# Patient Record
Sex: MALE | Race: WHITE | NOT HISPANIC OR LATINO
[De-identification: names, ages, dates, MRNs, and addresses within clinical notes are randomized per-mention and may not be internally consistent; named-entity substitution may affect disease eponyms.]

---

## 2018-04-06 ENCOUNTER — RECORD ABSTRACTING (OUTPATIENT)
Age: 33
End: 2018-04-06

## 2018-04-06 DIAGNOSIS — R10.9 UNSPECIFIED ABDOMINAL PAIN: ICD-10-CM

## 2018-04-06 DIAGNOSIS — Z91.89 OTHER SPECIFIED PERSONAL RISK FACTORS, NOT ELSEWHERE CLASSIFIED: ICD-10-CM

## 2018-04-06 DIAGNOSIS — Z86.59 PERSONAL HISTORY OF OTHER MENTAL AND BEHAVIORAL DISORDERS: ICD-10-CM

## 2018-04-06 DIAGNOSIS — S39.012A STRAIN OF MUSCLE, FASCIA AND TENDON OF LOWER BACK, INITIAL ENCOUNTER: ICD-10-CM

## 2018-04-06 DIAGNOSIS — J06.9 ACUTE UPPER RESPIRATORY INFECTION, UNSPECIFIED: ICD-10-CM

## 2018-04-06 DIAGNOSIS — Z78.9 OTHER SPECIFIED HEALTH STATUS: ICD-10-CM

## 2018-04-06 DIAGNOSIS — Z87.898 PERSONAL HISTORY OF OTHER SPECIFIED CONDITIONS: ICD-10-CM

## 2018-04-11 ENCOUNTER — APPOINTMENT (OUTPATIENT)
Dept: FAMILY MEDICINE | Facility: CLINIC | Age: 33
End: 2018-04-11
Payer: SELF-PAY

## 2018-04-11 VITALS
WEIGHT: 194 LBS | BODY MASS INDEX: 30.45 KG/M2 | DIASTOLIC BLOOD PRESSURE: 70 MMHG | HEART RATE: 58 BPM | SYSTOLIC BLOOD PRESSURE: 118 MMHG | TEMPERATURE: 97.7 F | RESPIRATION RATE: 15 BRPM | OXYGEN SATURATION: 98 % | HEIGHT: 67 IN

## 2018-04-11 PROCEDURE — 81002 URINALYSIS NONAUTO W/O SCOPE: CPT

## 2018-04-11 PROCEDURE — 99395 PREV VISIT EST AGE 18-39: CPT

## 2018-04-12 LAB
BILIRUB UR QL STRIP: NORMAL
GLUCOSE UR-MCNC: NORMAL
HCG UR QL: 0.2 EU/DL
HGB UR QL STRIP.AUTO: NORMAL
KETONES UR-MCNC: NORMAL
LEUKOCYTE ESTERASE UR QL STRIP: NORMAL
NITRITE UR QL STRIP: NORMAL
PH UR STRIP: 7
PROT UR STRIP-MCNC: NORMAL
SP GR UR STRIP: 1.01

## 2018-05-11 ENCOUNTER — APPOINTMENT (OUTPATIENT)
Dept: FAMILY MEDICINE | Facility: CLINIC | Age: 33
End: 2018-05-11

## 2018-05-12 ENCOUNTER — APPOINTMENT (OUTPATIENT)
Dept: FAMILY MEDICINE | Facility: CLINIC | Age: 33
End: 2018-05-12
Payer: COMMERCIAL

## 2018-05-12 VITALS
BODY MASS INDEX: 27.3 KG/M2 | DIASTOLIC BLOOD PRESSURE: 70 MMHG | TEMPERATURE: 97.7 F | HEIGHT: 71 IN | RESPIRATION RATE: 16 BRPM | SYSTOLIC BLOOD PRESSURE: 100 MMHG | WEIGHT: 195 LBS | OXYGEN SATURATION: 98 % | HEART RATE: 61 BPM

## 2018-05-12 DIAGNOSIS — Z91.09 OTHER ALLERGY STATUS, OTHER THAN TO DRUGS AND BIOLOGICAL SUBSTANCES: ICD-10-CM

## 2018-05-12 DIAGNOSIS — J32.9 CHRONIC SINUSITIS, UNSPECIFIED: ICD-10-CM

## 2018-05-12 DIAGNOSIS — R69 ILLNESS, UNSPECIFIED: ICD-10-CM

## 2018-05-12 PROCEDURE — 71046 X-RAY EXAM CHEST 2 VIEWS: CPT | Mod: 59

## 2018-05-12 PROCEDURE — 99214 OFFICE O/P EST MOD 30 MIN: CPT | Mod: 25

## 2018-05-12 PROCEDURE — 36415 COLL VENOUS BLD VENIPUNCTURE: CPT

## 2018-05-14 LAB
ALBUMIN SERPL ELPH-MCNC: 4.5 G/DL
ALP BLD-CCNC: 58 U/L
ALT SERPL-CCNC: 17 U/L
ANION GAP SERPL CALC-SCNC: 12 MMOL/L
AST SERPL-CCNC: 20 U/L
BASOPHILS # BLD AUTO: 0.01 K/UL
BASOPHILS NFR BLD AUTO: 0.3 %
BILIRUB SERPL-MCNC: 0.6 MG/DL
BUN SERPL-MCNC: 13 MG/DL
CALCIUM SERPL-MCNC: 9.8 MG/DL
CHLORIDE SERPL-SCNC: 102 MMOL/L
CHOLEST SERPL-MCNC: 153 MG/DL
CHOLEST/HDLC SERPL: 2.6 RATIO
CO2 SERPL-SCNC: 25 MMOL/L
CREAT SERPL-MCNC: 1.18 MG/DL
EOSINOPHIL # BLD AUTO: 0.02 K/UL
EOSINOPHIL NFR BLD AUTO: 0.6 %
ERYTHROCYTE [SEDIMENTATION RATE] IN BLOOD BY WESTERGREN METHOD: 9 MM/HR
GLUCOSE SERPL-MCNC: 93 MG/DL
HBA1C MFR BLD HPLC: 5.2 %
HCT VFR BLD CALC: 41.4 %
HDLC SERPL-MCNC: 60 MG/DL
HGB BLD-MCNC: 14.5 G/DL
IMM GRANULOCYTES NFR BLD AUTO: 0 %
LDLC SERPL CALC-MCNC: 83 MG/DL
LYMPHOCYTES # BLD AUTO: 0.88 K/UL
LYMPHOCYTES NFR BLD AUTO: 25.1 %
MAN DIFF?: NORMAL
MCHC RBC-ENTMCNC: 32.5 PG
MCHC RBC-ENTMCNC: 35 GM/DL
MCV RBC AUTO: 92.8 FL
MONOCYTES # BLD AUTO: 0.48 K/UL
MONOCYTES NFR BLD AUTO: 13.7 %
NEUTROPHILS # BLD AUTO: 2.11 K/UL
NEUTROPHILS NFR BLD AUTO: 60.3 %
PLATELET # BLD AUTO: 233 K/UL
POTASSIUM SERPL-SCNC: 4.2 MMOL/L
PROT SERPL-MCNC: 7.6 G/DL
RBC # BLD: 4.46 M/UL
RBC # FLD: 12.8 %
SODIUM SERPL-SCNC: 139 MMOL/L
TRIGL SERPL-MCNC: 49 MG/DL
TSH SERPL-ACNC: 0.35 UIU/ML
WBC # FLD AUTO: 3.5 K/UL

## 2018-05-15 LAB
M PNEUMO IGG SER IA-ACNC: POSITIVE
M PNEUMO IGG SER QL IA: 1.29 INDEX
M PNEUMO IGM SER QL IA: 80 UNITS/ML
MYCOPLASMA AG SPEC QL: NEGATIVE

## 2018-05-18 LAB
CHLAMYDIA PNEUMONIAE AB IGA: NORMAL TITER
CHLAMYDIA PNEUMONIAE AB IGG: NORMAL TITER
CHLAMYDIA PNEUMONIAE AB IGM: NORMAL TITER
TESTOST BND SERPL-MCNC: 8 PG/ML
TESTOST SERPL-MCNC: 225.8 NG/DL

## 2018-06-05 ENCOUNTER — NON-APPOINTMENT (OUTPATIENT)
Age: 33
End: 2018-06-05

## 2018-06-05 ENCOUNTER — APPOINTMENT (OUTPATIENT)
Dept: FAMILY MEDICINE | Facility: CLINIC | Age: 33
End: 2018-06-05
Payer: COMMERCIAL

## 2018-06-05 VITALS
HEIGHT: 71 IN | HEART RATE: 71 BPM | RESPIRATION RATE: 16 BRPM | OXYGEN SATURATION: 98 % | SYSTOLIC BLOOD PRESSURE: 130 MMHG | WEIGHT: 193 LBS | DIASTOLIC BLOOD PRESSURE: 80 MMHG | BODY MASS INDEX: 27.02 KG/M2 | TEMPERATURE: 98.1 F

## 2018-06-05 PROCEDURE — 93000 ELECTROCARDIOGRAM COMPLETE: CPT

## 2018-06-05 PROCEDURE — 99214 OFFICE O/P EST MOD 30 MIN: CPT | Mod: 25

## 2018-06-05 RX ORDER — METHYLPREDNISOLONE 4 MG/1
4 TABLET ORAL
Qty: 1 | Refills: 0 | Status: COMPLETED | COMMUNITY
Start: 2018-05-12 | End: 2018-05-19

## 2018-06-05 RX ORDER — AZITHROMYCIN 250 MG/1
250 TABLET, FILM COATED ORAL
Qty: 6 | Refills: 0 | Status: COMPLETED | COMMUNITY
Start: 2018-05-12 | End: 2018-05-19

## 2018-06-05 RX ORDER — LEVOCETIRIZINE DIHYDROCHLORIDE 5 MG/1
5 TABLET ORAL DAILY
Qty: 90 | Refills: 0 | Status: COMPLETED | COMMUNITY
Start: 2018-05-12 | End: 2018-05-19

## 2018-06-06 ENCOUNTER — APPOINTMENT (OUTPATIENT)
Dept: FAMILY MEDICINE | Facility: CLINIC | Age: 33
End: 2018-06-06

## 2018-06-06 NOTE — PHYSICAL EXAM
[No Acute Distress] : no acute distress [Well Nourished] : well nourished [Well Developed] : well developed [Well-Appearing] : well-appearing [Normal Sclera/Conjunctiva] : normal sclera/conjunctiva [PERRL] : pupils equal round and reactive to light [No Respiratory Distress] : no respiratory distress  [Clear to Auscultation] : lungs were clear to auscultation bilaterally [No Accessory Muscle Use] : no accessory muscle use [Normal Rate] : normal rate  [Regular Rhythm] : with a regular rhythm [Normal S1, S2] : normal S1 and S2 [Soft] : abdomen soft [Non Tender] : non-tender [Non-distended] : non-distended [No Masses] : no abdominal mass palpated [Normal Gait] : normal gait [Coordination Grossly Intact] : coordination grossly intact [No Focal Deficits] : no focal deficits [Normal Affect] : the affect was normal [Normal Insight/Judgement] : insight and judgment were intact [Suicidal Ideation] : denied suicidal ideation [Homicidal Ideation] : denied homicidal ideation

## 2018-06-06 NOTE — HISTORY OF PRESENT ILLNESS
[FreeTextEntry1] : Pt c/o anxiety acting up do to running out of medication Xanax. \par Pt states he been having palpations, shortness of breath on/off.  [de-identified] : 33 year old male here with SOB and palps which is intermittent and usually occurs after he's done with work. He saw the pulmonologist who cleared him and also gave him a short supply of xanax. He was previously prescribed zoloft for anxiety and depression, but never took it. He is willing to try it now. No SI HI

## 2018-06-06 NOTE — PLAN
[FreeTextEntry1] : meds as directed. I advised on judicious use of xanax. HE understood\par ekg-sinus \par Go to ER is palps worsen or CP occurs\par f/u 1 month

## 2018-06-08 ENCOUNTER — APPOINTMENT (OUTPATIENT)
Dept: FAMILY MEDICINE | Facility: CLINIC | Age: 33
End: 2018-06-08

## 2018-08-30 ENCOUNTER — RX RENEWAL (OUTPATIENT)
Age: 33
End: 2018-08-30

## 2018-09-04 ENCOUNTER — RX RENEWAL (OUTPATIENT)
Age: 33
End: 2018-09-04

## 2018-10-10 ENCOUNTER — APPOINTMENT (OUTPATIENT)
Dept: FAMILY MEDICINE | Facility: CLINIC | Age: 33
End: 2018-10-10
Payer: COMMERCIAL

## 2018-10-10 VITALS
BODY MASS INDEX: 27.72 KG/M2 | RESPIRATION RATE: 16 BRPM | WEIGHT: 198 LBS | OXYGEN SATURATION: 97 % | SYSTOLIC BLOOD PRESSURE: 110 MMHG | DIASTOLIC BLOOD PRESSURE: 80 MMHG | HEART RATE: 88 BPM | HEIGHT: 71 IN | TEMPERATURE: 98 F

## 2018-10-10 DIAGNOSIS — F41.8 OTHER SPECIFIED ANXIETY DISORDERS: ICD-10-CM

## 2018-10-10 PROCEDURE — 36415 COLL VENOUS BLD VENIPUNCTURE: CPT

## 2018-10-10 PROCEDURE — 90714 TD VACC NO PRESV 7 YRS+ IM: CPT

## 2018-10-10 PROCEDURE — 99213 OFFICE O/P EST LOW 20 MIN: CPT | Mod: 25

## 2018-10-10 PROCEDURE — 90471 IMMUNIZATION ADMIN: CPT

## 2018-10-10 RX ORDER — SERTRALINE HYDROCHLORIDE 50 MG/1
50 TABLET, FILM COATED ORAL DAILY
Qty: 30 | Refills: 2 | Status: DISCONTINUED | COMMUNITY
Start: 2018-06-05 | End: 2018-07-05

## 2018-10-10 RX ORDER — LEVOCETIRIZINE DIHYDROCHLORIDE 5 MG/1
5 TABLET ORAL DAILY
Qty: 90 | Refills: 0 | Status: DISCONTINUED | COMMUNITY
Start: 2018-08-30 | End: 2018-09-30

## 2018-10-10 NOTE — HEALTH RISK ASSESSMENT
[No falls in past year] : Patient reported no falls in the past year [0] : 2) Feeling down, depressed, or hopeless: Not at all (0) [] : No [de-identified] : Social  [[id number]] : 0
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
## 2018-10-11 LAB
HCV AB SER QL: NONREACTIVE
HCV S/CO RATIO: 0.23 S/CO
HIV1+2 AB SPEC QL IA.RAPID: NONREACTIVE

## 2018-12-28 ENCOUNTER — APPOINTMENT (OUTPATIENT)
Dept: FAMILY MEDICINE | Facility: CLINIC | Age: 33
End: 2018-12-28
Payer: COMMERCIAL

## 2018-12-28 VITALS
HEIGHT: 71 IN | HEART RATE: 76 BPM | BODY MASS INDEX: 27.3 KG/M2 | WEIGHT: 195 LBS | DIASTOLIC BLOOD PRESSURE: 80 MMHG | RESPIRATION RATE: 16 BRPM | TEMPERATURE: 98.6 F | SYSTOLIC BLOOD PRESSURE: 126 MMHG | OXYGEN SATURATION: 97 %

## 2018-12-28 DIAGNOSIS — R53.83 OTHER FATIGUE: ICD-10-CM

## 2018-12-28 PROCEDURE — 99213 OFFICE O/P EST LOW 20 MIN: CPT

## 2018-12-28 NOTE — PLAN
[FreeTextEntry1] : pt urged to avoid alcohol and to try to commit to 6 months treatment before tapering off to decrease the likelihood of rapid relapse

## 2018-12-28 NOTE — HISTORY OF PRESENT ILLNESS
[FreeTextEntry1] : discuss going back on zoloft stopped about three months  [de-identified] : the pt had been on zoloft for a few months for anxiety and "took myself off about 3 months ago" He now has noticed mild anxiety w/o panic but has more symptoms related to depression (sadness, fatigue). He does not know why he feels sad stating he has a good life, job and is happily

## 2019-01-25 ENCOUNTER — RX RENEWAL (OUTPATIENT)
Age: 34
End: 2019-01-25

## 2019-04-21 ENCOUNTER — RX RENEWAL (OUTPATIENT)
Age: 34
End: 2019-04-21

## 2019-07-22 ENCOUNTER — APPOINTMENT (OUTPATIENT)
Dept: FAMILY MEDICINE | Facility: CLINIC | Age: 34
End: 2019-07-22
Payer: COMMERCIAL

## 2019-07-22 VITALS
TEMPERATURE: 98.5 F | OXYGEN SATURATION: 98 % | DIASTOLIC BLOOD PRESSURE: 82 MMHG | BODY MASS INDEX: 28.84 KG/M2 | HEART RATE: 60 BPM | HEIGHT: 71 IN | WEIGHT: 206 LBS | RESPIRATION RATE: 16 BRPM | SYSTOLIC BLOOD PRESSURE: 118 MMHG

## 2019-07-22 PROCEDURE — 99214 OFFICE O/P EST MOD 30 MIN: CPT | Mod: 25

## 2019-07-22 PROCEDURE — 36415 COLL VENOUS BLD VENIPUNCTURE: CPT

## 2019-07-22 PROCEDURE — 72050 X-RAY EXAM NECK SPINE 4/5VWS: CPT | Mod: 59

## 2019-07-22 RX ORDER — ALPRAZOLAM 0.25 MG/1
0.25 TABLET ORAL DAILY
Qty: 14 | Refills: 0 | Status: DISCONTINUED | COMMUNITY
Start: 2018-06-05 | End: 2019-07-22

## 2019-07-22 RX ORDER — ESCITALOPRAM OXALATE 20 MG/1
20 TABLET ORAL
Qty: 90 | Refills: 0 | Status: DISCONTINUED | COMMUNITY
Start: 2018-12-28 | End: 2019-07-22

## 2019-07-25 NOTE — PHYSICAL EXAM
[No Acute Distress] : no acute distress [Well Nourished] : well nourished [Well Developed] : well developed [Well-Appearing] : well-appearing [EOMI] : extraocular movements intact [PERRL] : pupils equal round and reactive to light [Normal Sclera/Conjunctiva] : normal sclera/conjunctiva [Normal Outer Ear/Nose] : the outer ears and nose were normal in appearance [Normal Oropharynx] : the oropharynx was normal [No Lymphadenopathy] : no lymphadenopathy [No JVD] : no jugular venous distention [Supple] : supple [Thyroid Normal, No Nodules] : the thyroid was normal and there were no nodules present [No Respiratory Distress] : no respiratory distress  [Clear to Auscultation] : lungs were clear to auscultation bilaterally [No Accessory Muscle Use] : no accessory muscle use [Normal S1, S2] : normal S1 and S2 [Regular Rhythm] : with a regular rhythm [Normal Rate] : normal rate  [No Murmur] : no murmur heard [Soft] : abdomen soft [Non Tender] : non-tender [Non-distended] : non-distended [Normal Posterior Cervical Nodes] : no posterior cervical lymphadenopathy [Normal Anterior Cervical Nodes] : no anterior cervical lymphadenopathy [Grossly Normal Strength/Tone] : grossly normal strength/tone [No Joint Swelling] : no joint swelling [No Focal Deficits] : no focal deficits [No Rash] : no rash [Coordination Grossly Intact] : coordination grossly intact [Normal Affect] : the affect was normal [Deep Tendon Reflexes (DTR)] : deep tendon reflexes were 2+ and symmetric [Normal Gait] : normal gait [Normal Insight/Judgement] : insight and judgment were intact [de-identified] : left breast nipple small glandular lump noted. no discharge.

## 2019-07-25 NOTE — HEALTH RISK ASSESSMENT
[Yes] : Yes [2 - 4 times a month (2 pts)] : 2-4 times a month (2 points) [3 or 4 (1 pt)] : 3 or 4  (1 point) [Never (0 pts)] : Never (0 points) [No] : In the past 12 months have you used drugs other than those required for medical reasons? No [0] : 2) Feeling down, depressed, or hopeless: Not at all (0) [Two or more falls in past year] : Patient reported two or more falls in the past year [] : No [Audit-CScore] : 3

## 2019-07-25 NOTE — HISTORY OF PRESENT ILLNESS
[FreeTextEntry8] : NELY states he has a lump on left breast noticed 3 weeks ago \par NELY has neck pain for years \par NELY stopped taking lexapro and xanax, only takes heart burn medication\par [facility] [place]

## 2019-07-26 LAB
ESTROGEN SERPL-MCNC: 76 PG/ML
TESTOST BND SERPL-MCNC: 6.4 PG/ML
TESTOST SERPL-MCNC: 179.3 NG/DL

## 2019-07-31 LAB — DHEA-SULFATE, SERUM: 154 UG/DL

## 2019-08-02 ENCOUNTER — APPOINTMENT (OUTPATIENT)
Dept: MAMMOGRAPHY | Facility: CLINIC | Age: 34
End: 2019-08-02
Payer: COMMERCIAL

## 2019-08-02 PROCEDURE — G0279: CPT

## 2019-08-02 PROCEDURE — 77066 DX MAMMO INCL CAD BI: CPT

## 2019-08-12 ENCOUNTER — APPOINTMENT (OUTPATIENT)
Dept: FAMILY MEDICINE | Facility: CLINIC | Age: 34
End: 2019-08-12
Payer: COMMERCIAL

## 2019-08-12 PROCEDURE — 36415 COLL VENOUS BLD VENIPUNCTURE: CPT

## 2019-08-13 ENCOUNTER — APPOINTMENT (OUTPATIENT)
Dept: FAMILY MEDICINE | Facility: CLINIC | Age: 34
End: 2019-08-13

## 2019-08-20 LAB
BASOPHILS # BLD AUTO: 0.03 K/UL
BASOPHILS NFR BLD AUTO: 0.6 %
EOSINOPHIL # BLD AUTO: 0.02 K/UL
EOSINOPHIL NFR BLD AUTO: 0.4 %
HCT VFR BLD CALC: 42.7 %
HGB BLD-MCNC: 14.5 G/DL
IMM GRANULOCYTES NFR BLD AUTO: 0.2 %
LYMPHOCYTES # BLD AUTO: 1.65 K/UL
LYMPHOCYTES NFR BLD AUTO: 30.3 %
MAN DIFF?: NORMAL
MCHC RBC-ENTMCNC: 33.4 PG
MCHC RBC-ENTMCNC: 34 GM/DL
MCV RBC AUTO: 98.4 FL
MONOCYTES # BLD AUTO: 0.61 K/UL
MONOCYTES NFR BLD AUTO: 11.2 %
NEUTROPHILS # BLD AUTO: 3.13 K/UL
NEUTROPHILS NFR BLD AUTO: 57.3 %
PLATELET # BLD AUTO: 219 K/UL
PSA SERPL-MCNC: 0.67 NG/ML
RBC # BLD: 4.34 M/UL
RBC # FLD: 12.9 %
WBC # FLD AUTO: 5.45 K/UL

## 2019-09-03 ENCOUNTER — APPOINTMENT (OUTPATIENT)
Dept: ULTRASOUND IMAGING | Facility: CLINIC | Age: 34
End: 2019-09-03
Payer: COMMERCIAL

## 2019-09-03 PROCEDURE — 76641 ULTRASOUND BREAST COMPLETE: CPT | Mod: LT

## 2019-09-04 DIAGNOSIS — N64.4 MASTODYNIA: ICD-10-CM

## 2019-09-06 PROBLEM — N64.4 BREAST TENDERNESS IN MALE: Status: ACTIVE | Noted: 2019-07-22

## 2019-11-05 ENCOUNTER — RX CHANGE (OUTPATIENT)
Age: 34
End: 2019-11-05

## 2019-11-06 ENCOUNTER — APPOINTMENT (OUTPATIENT)
Dept: FAMILY MEDICINE | Facility: CLINIC | Age: 34
End: 2019-11-06

## 2019-12-03 ENCOUNTER — APPOINTMENT (OUTPATIENT)
Dept: FAMILY MEDICINE | Facility: CLINIC | Age: 34
End: 2019-12-03
Payer: COMMERCIAL

## 2019-12-03 VITALS
HEIGHT: 71 IN | DIASTOLIC BLOOD PRESSURE: 76 MMHG | HEART RATE: 82 BPM | SYSTOLIC BLOOD PRESSURE: 122 MMHG | WEIGHT: 208 LBS | TEMPERATURE: 98.1 F | OXYGEN SATURATION: 98 % | RESPIRATION RATE: 15 BRPM | BODY MASS INDEX: 29.12 KG/M2

## 2019-12-03 PROCEDURE — 99213 OFFICE O/P EST LOW 20 MIN: CPT

## 2019-12-03 NOTE — PHYSICAL EXAM
[No Acute Distress] : no acute distress [Well Nourished] : well nourished [Well Developed] : well developed [Well-Appearing] : well-appearing [Normal Sclera/Conjunctiva] : normal sclera/conjunctiva [PERRL] : pupils equal round and reactive to light [EOMI] : extraocular movements intact [Normal Outer Ear/Nose] : the outer ears and nose were normal in appearance [Normal Oropharynx] : the oropharynx was normal [Normal TMs] : both tympanic membranes were normal [No JVD] : no jugular venous distention [No Lymphadenopathy] : no lymphadenopathy [Supple] : supple [No Accessory Muscle Use] : no accessory muscle use [No Respiratory Distress] : no respiratory distress  [Regular Rhythm] : with a regular rhythm [Normal Rate] : normal rate  [Clear to Auscultation] : lungs were clear to auscultation bilaterally [Normal S1, S2] : normal S1 and S2 [No Murmur] : no murmur heard [Soft] : abdomen soft [Non-distended] : non-distended [Non Tender] : non-tender [Normal Anterior Cervical Nodes] : no anterior cervical lymphadenopathy [Normal Posterior Cervical Nodes] : no posterior cervical lymphadenopathy [No Joint Swelling] : no joint swelling [Grossly Normal Strength/Tone] : grossly normal strength/tone [No Rash] : no rash [Coordination Grossly Intact] : coordination grossly intact [No Focal Deficits] : no focal deficits [Normal Gait] : normal gait [Deep Tendon Reflexes (DTR)] : deep tendon reflexes were 2+ and symmetric [Normal Insight/Judgement] : insight and judgment were intact [Normal Affect] : the affect was normal [de-identified] : left breast nipple small glandular lump noted. no discharge.

## 2019-12-03 NOTE — HISTORY OF PRESENT ILLNESS
[FreeTextEntry1] : Patient requesting to start taking Lexapro and Xanax again; has been on it in the past \par hasn’t taken it several months \par c/o recent depression and anxiety \par [facility] [place]

## 2020-01-17 ENCOUNTER — EMERGENCY (EMERGENCY)
Facility: HOSPITAL | Age: 35
LOS: 1 days | Discharge: HOME | End: 2020-01-17
Attending: EMERGENCY MEDICINE
Payer: COMMERCIAL

## 2020-01-17 VITALS
OXYGEN SATURATION: 99 % | HEIGHT: 71 IN | HEART RATE: 72 BPM | RESPIRATION RATE: 18 BRPM | TEMPERATURE: 98 F | WEIGHT: 210.1 LBS | DIASTOLIC BLOOD PRESSURE: 96 MMHG | SYSTOLIC BLOOD PRESSURE: 143 MMHG

## 2020-01-17 PROCEDURE — 99283 EMERGENCY DEPT VISIT LOW MDM: CPT

## 2020-01-17 RX ORDER — IBUPROFEN 200 MG
1 TABLET ORAL
Qty: 20 | Refills: 0
Start: 2020-01-17 | End: 2020-01-21

## 2020-01-17 RX ORDER — DIAZEPAM 5 MG
1 TABLET ORAL
Qty: 8 | Refills: 0
Start: 2020-01-17 | End: 2020-01-20

## 2020-01-17 RX ORDER — DIAZEPAM 5 MG
5 TABLET ORAL ONCE
Refills: 0 | Status: DISCONTINUED | OUTPATIENT
Start: 2020-01-17 | End: 2020-01-17

## 2020-01-17 RX ORDER — IBUPROFEN 200 MG
600 TABLET ORAL ONCE
Refills: 0 | Status: COMPLETED | OUTPATIENT
Start: 2020-01-17 | End: 2020-01-17

## 2020-01-17 RX ADMIN — Medication 600 MILLIGRAM(S): at 18:25

## 2020-01-17 RX ADMIN — Medication 5 MILLIGRAM(S): at 18:25
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
## 2020-01-17 NOTE — ED STATDOCS - MUSCULOSKELETAL, MLM
lateral lateral trapezius tenderness, full ROM of neck with no midline cervical, lumbar, thoracic tenderness to palpation, no bony stepoff or obvious deformities

## 2020-01-17 NOTE — ED STATDOCS - NSFOLLOWUPCLINICS_GEN_ALL_ED_FT
[facility]  Ortho/Spine  [address] [place] [address]  Phone: [phone]  Fax:   Follow Up Time: 7-10 Days    [facility] - [place]  Neurosurgery/Spine  [address], [address]  [place] [address]  Phone: [phone]  Fax:   Follow Up Time: 7-10 Days    [facility]  Neurosurgery/Spine  [address] [place] [address]  Phone: [phone]  Fax:   Follow Up Time: 7-10 Days

## 2020-01-17 NOTE — ED STATDOCS - NSFOLLOWUPINSTRUCTIONS_ED_ALL_ED_FT
Take meds as prescribed, don't drive or operate heavy mahcinery while on valium . Follow up with [facility] in c/o persistent  neck pain

## 2020-01-17 NOTE — ED STATDOCS - PATIENT PORTAL LINK FT
You can access the FollowMyHealth Patient Portal offered by [facility] by registering at the following website: [email or web address]. By joining [facility]’s FollowMyHealth portal, you will also be able to view your health information using other applications (apps) compatible with our system.

## 2020-04-29 PROBLEM — Z78.9 OTHER SPECIFIED HEALTH STATUS: Chronic | Status: ACTIVE | Noted: 2020-01-17

## 2020-04-30 ENCOUNTER — APPOINTMENT (OUTPATIENT)
Dept: FAMILY MEDICINE | Facility: CLINIC | Age: 35
End: 2020-04-30
Payer: COMMERCIAL

## 2020-04-30 VITALS
TEMPERATURE: 98.3 F | OXYGEN SATURATION: 98 % | HEART RATE: 73 BPM | SYSTOLIC BLOOD PRESSURE: 132 MMHG | DIASTOLIC BLOOD PRESSURE: 90 MMHG | WEIGHT: 213 LBS | RESPIRATION RATE: 15 BRPM | BODY MASS INDEX: 29.82 KG/M2 | HEIGHT: 71 IN

## 2020-04-30 DIAGNOSIS — K21.9 GASTRO-ESOPHAGEAL REFLUX DISEASE W/OUT ESOPHAGITIS: ICD-10-CM

## 2020-04-30 LAB
BILIRUB UR QL STRIP: NORMAL
GLUCOSE UR-MCNC: NORMAL
HCG UR QL: 0.2 EU/DL
HGB UR QL STRIP.AUTO: NORMAL
KETONES UR-MCNC: NORMAL
LEUKOCYTE ESTERASE UR QL STRIP: NORMAL
NITRITE UR QL STRIP: NORMAL
PH UR STRIP: 6.5
PROT UR STRIP-MCNC: NORMAL
SP GR UR STRIP: 1.02

## 2020-04-30 PROCEDURE — 81003 URINALYSIS AUTO W/O SCOPE: CPT | Mod: NC,QW

## 2020-04-30 PROCEDURE — 36415 COLL VENOUS BLD VENIPUNCTURE: CPT

## 2020-04-30 PROCEDURE — 99204 OFFICE O/P NEW MOD 45 MIN: CPT | Mod: 25

## 2020-04-30 RX ORDER — ESCITALOPRAM OXALATE 10 MG/1
10 TABLET ORAL
Qty: 30 | Refills: 2 | Status: DISCONTINUED | COMMUNITY
Start: 2019-01-25 | End: 2020-04-30

## 2020-04-30 RX ORDER — OMEPRAZOLE 40 MG/1
40 CAPSULE, DELAYED RELEASE ORAL
Qty: 30 | Refills: 0 | Status: DISCONTINUED | COMMUNITY
Start: 2018-05-16 | End: 2020-04-30

## 2020-04-30 RX ORDER — ALPRAZOLAM 0.25 MG/1
0.25 TABLET ORAL TWICE DAILY
Qty: 60 | Refills: 0 | Status: DISCONTINUED | COMMUNITY
Start: 2018-10-10 | End: 2020-04-30

## 2020-04-30 NOTE — PHYSICAL EXAM
[No Acute Distress] : no acute distress [Well Nourished] : well nourished [Well Developed] : well developed [Well-Appearing] : well-appearing [Normal Sclera/Conjunctiva] : normal sclera/conjunctiva [PERRL] : pupils equal round and reactive to light [Normal Outer Ear/Nose] : the outer ears and nose were normal in appearance [EOMI] : extraocular movements intact [Normal Oropharynx] : the oropharynx was normal [No JVD] : no jugular venous distention [No Lymphadenopathy] : no lymphadenopathy [Thyroid Normal, No Nodules] : the thyroid was normal and there were no nodules present [No Respiratory Distress] : no respiratory distress  [Supple] : supple [No Accessory Muscle Use] : no accessory muscle use [Normal Rate] : normal rate  [Clear to Auscultation] : lungs were clear to auscultation bilaterally [Normal S1, S2] : normal S1 and S2 [Regular Rhythm] : with a regular rhythm [No Abdominal Bruit] : a ~M bruit was not heard ~T in the abdomen [No Murmur] : no murmur heard [No Carotid Bruits] : no carotid bruits [No Varicosities] : no varicosities [Pedal Pulses Present] : the pedal pulses are present [No Edema] : there was no peripheral edema [No Extremity Clubbing/Cyanosis] : no extremity clubbing/cyanosis [No Palpable Aorta] : no palpable aorta [Soft] : abdomen soft [Non Tender] : non-tender [Non-distended] : non-distended [No Masses] : no abdominal mass palpated [No HSM] : no HSM [Normal Bowel Sounds] : normal bowel sounds [Normal Posterior Cervical Nodes] : no posterior cervical lymphadenopathy [Normal Anterior Cervical Nodes] : no anterior cervical lymphadenopathy [No CVA Tenderness] : no CVA  tenderness [No Spinal Tenderness] : no spinal tenderness [No Rash] : no rash [No Joint Swelling] : no joint swelling [Grossly Normal Strength/Tone] : grossly normal strength/tone [Coordination Grossly Intact] : coordination grossly intact [No Focal Deficits] : no focal deficits [Normal Gait] : normal gait [Deep Tendon Reflexes (DTR)] : deep tendon reflexes were 2+ and symmetric [Normal Insight/Judgement] : insight and judgment were intact [Normal Affect] : the affect was normal

## 2020-04-30 NOTE — PLAN
[FreeTextEntry1] : medications as directed.\par fu phone call 4-6 weeks \par follow up if worsen or persists.\par

## 2020-04-30 NOTE — HISTORY OF PRESENT ILLNESS
[FreeTextEntry1] : Patient is here for testosterone levels; possible blood work (not fasting today) [de-identified] : 34  low libido some ED hx low t anxiety off meds stable alert in nad \par Review of symptoms\par as above

## 2020-05-01 LAB
ALBUMIN SERPL ELPH-MCNC: 4.6 G/DL
ALP BLD-CCNC: 63 U/L
ALT SERPL-CCNC: 41 U/L
ANION GAP SERPL CALC-SCNC: 15 MMOL/L
AST SERPL-CCNC: 20 U/L
BASOPHILS # BLD AUTO: 0.02 K/UL
BASOPHILS NFR BLD AUTO: 0.2 %
BILIRUB SERPL-MCNC: 0.3 MG/DL
BUN SERPL-MCNC: 20 MG/DL
CALCIUM SERPL-MCNC: 9.8 MG/DL
CHLORIDE SERPL-SCNC: 101 MMOL/L
CHOLEST SERPL-MCNC: 197 MG/DL
CHOLEST/HDLC SERPL: 3.4 RATIO
CO2 SERPL-SCNC: 23 MMOL/L
CREAT SERPL-MCNC: 1.27 MG/DL
EOSINOPHIL # BLD AUTO: 0.01 K/UL
EOSINOPHIL NFR BLD AUTO: 0.1 %
GLUCOSE SERPL-MCNC: 99 MG/DL
HCT VFR BLD CALC: 43.2 %
HDLC SERPL-MCNC: 58 MG/DL
HGB BLD-MCNC: 15 G/DL
IMM GRANULOCYTES NFR BLD AUTO: 0.6 %
LDLC SERPL CALC-MCNC: 108 MG/DL
LYMPHOCYTES # BLD AUTO: 1.61 K/UL
LYMPHOCYTES NFR BLD AUTO: 13.4 %
MAN DIFF?: NORMAL
MCHC RBC-ENTMCNC: 32.6 PG
MCHC RBC-ENTMCNC: 34.7 GM/DL
MCV RBC AUTO: 93.9 FL
MONOCYTES # BLD AUTO: 1.03 K/UL
MONOCYTES NFR BLD AUTO: 8.6 %
NEUTROPHILS # BLD AUTO: 9.29 K/UL
NEUTROPHILS NFR BLD AUTO: 77.1 %
PLATELET # BLD AUTO: 258 K/UL
POTASSIUM SERPL-SCNC: 4.4 MMOL/L
PROT SERPL-MCNC: 7.3 G/DL
RBC # BLD: 4.6 M/UL
RBC # FLD: 11.7 %
SODIUM SERPL-SCNC: 139 MMOL/L
TRIGL SERPL-MCNC: 153 MG/DL
TSH SERPL-ACNC: 0.86 UIU/ML
WBC # FLD AUTO: 12.03 K/UL

## 2020-05-04 LAB
TESTOST BND SERPL-MCNC: 3.6 PG/ML
TESTOST SERPL-MCNC: 111.2 NG/DL

## 2020-05-07 ENCOUNTER — APPOINTMENT (OUTPATIENT)
Dept: FAMILY MEDICINE | Facility: CLINIC | Age: 35
End: 2020-05-07

## 2020-05-28 ENCOUNTER — APPOINTMENT (OUTPATIENT)
Dept: FAMILY MEDICINE | Facility: CLINIC | Age: 35
End: 2020-05-28

## 2020-07-03 NOTE — PLAN
03-Jul-2020
[FreeTextEntry1] : Blood work obtained.\par Will call with results of blood work.\par NELY will f/u mammogram. \par rx omeprazole. \par NELY will f/u xray.

## 2021-01-24 ENCOUNTER — TRANSCRIPTION ENCOUNTER (OUTPATIENT)
Age: 36
End: 2021-01-24

## 2021-02-03 ENCOUNTER — APPOINTMENT (OUTPATIENT)
Dept: FAMILY MEDICINE | Facility: CLINIC | Age: 36
End: 2021-02-03
Payer: COMMERCIAL

## 2021-02-03 VITALS
BODY MASS INDEX: 30.66 KG/M2 | WEIGHT: 219 LBS | OXYGEN SATURATION: 97 % | RESPIRATION RATE: 16 BRPM | HEART RATE: 66 BPM | TEMPERATURE: 98 F | SYSTOLIC BLOOD PRESSURE: 140 MMHG | HEIGHT: 71 IN | DIASTOLIC BLOOD PRESSURE: 98 MMHG

## 2021-02-03 VITALS — SYSTOLIC BLOOD PRESSURE: 140 MMHG | DIASTOLIC BLOOD PRESSURE: 92 MMHG

## 2021-02-03 DIAGNOSIS — J81.1 CHRONIC PULMONARY EDEMA: ICD-10-CM

## 2021-02-03 DIAGNOSIS — Z00.00 ENCOUNTER FOR GENERAL ADULT MEDICAL EXAMINATION W/OUT ABNORMAL FINDINGS: ICD-10-CM

## 2021-02-03 PROCEDURE — 99395 PREV VISIT EST AGE 18-39: CPT

## 2021-02-03 PROCEDURE — 99072 ADDL SUPL MATRL&STAF TM PHE: CPT

## 2021-02-03 NOTE — PHYSICAL EXAM
[No Edema] : there was no peripheral edema [No Extremity Clubbing/Cyanosis] : no extremity clubbing/cyanosis [Coordination Grossly Intact] : coordination grossly intact [No Focal Deficits] : no focal deficits [Normal Gait] : normal gait [Normal] : affect was normal and insight and judgment were intact

## 2021-02-03 NOTE — ASSESSMENT
[FreeTextEntry1] : elevated blood pressure likely a side effect of Clomid\par Advised low sodium diet\par F/U with Endo Dr Moseley. If elevation persists then RTO for recommendation

## 2021-02-03 NOTE — HEALTH RISK ASSESSMENT
[Yes] : Yes [Monthly or less (1 pt)] : Monthly or less (1 point) [3 or 4 (1 pt)] : 3 or 4  (1 point) [Never (0 pts)] : Never (0 points) [0] : 2) Feeling down, depressed, or hopeless: Not at all (0) [] : No

## 2021-02-09 ENCOUNTER — APPOINTMENT (OUTPATIENT)
Dept: FAMILY MEDICINE | Facility: CLINIC | Age: 36
End: 2021-02-09

## 2021-03-17 ENCOUNTER — APPOINTMENT (OUTPATIENT)
Dept: ORTHOPEDIC SURGERY | Facility: CLINIC | Age: 36
End: 2021-03-17
Payer: COMMERCIAL

## 2021-03-17 VITALS — HEIGHT: 71 IN | BODY MASS INDEX: 30.1 KG/M2 | WEIGHT: 215 LBS

## 2021-03-17 DIAGNOSIS — M48.02 SPINAL STENOSIS, CERVICAL REGION: ICD-10-CM

## 2021-03-17 DIAGNOSIS — M50.20 OTHER CERVICAL DISC DISPLACEMENT, UNSPECIFIED CERVICAL REGION: ICD-10-CM

## 2021-03-17 DIAGNOSIS — M54.12 RADICULOPATHY, CERVICAL REGION: ICD-10-CM

## 2021-03-17 PROCEDURE — 99072 ADDL SUPL MATRL&STAF TM PHE: CPT

## 2021-03-17 PROCEDURE — 99204 OFFICE O/P NEW MOD 45 MIN: CPT

## 2021-03-17 NOTE — PHYSICAL EXAM
[Ataxic] : not ataxic [de-identified] : Examination of the cervical spine reveals no midline or paraspinal tenderness to palpation. No cervical lymphadenopathy. Decreased range of motion with respect to flexion, extension, rotation, and lateral bending.  Equivocal Spurlings. Negative Lhermitte's. Full range of motion bilateral shoulders without evidence of impingement. No instability of bilateral upper extremities.  Cranial nerves II through XII grossly intact. Intact sensation bilateral upper extremities. 5/5 deltoids biceps triceps wrist extensors wrist flexors finger flexors and hand intrinsics. 1+ biceps triceps and brachioradialis reflexes. Negative Carreon's. 2+ radial pulse. Negative Tinel's over the cubital and carpal tunnel. No skin lesions on the right and left upper extremities. [de-identified] : Previous cervical spine MRI reveals a right paracentral disc herniation at C5-6 with stenosis
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
## 2021-03-17 NOTE — DISCUSSION/SUMMARY
[de-identified] : We discussed further treatment options.  We discussed the role of surgery given his failure of nonsurgical management.  I have explained to him that surgery is designed to help alleviate his radiculopathy.  It may not have any impact on his neck pain nor his headaches or dizziness.  I have recommended further evaluation with a neurologist for this.  He does indicate he has seen a neurologist in the past.  I have reviewed the brain MRI report which indicates a normal study.  We will obtain an updated cervical spine MRI.  Follow-up afterwards.

## 2021-04-15 ENCOUNTER — NON-APPOINTMENT (OUTPATIENT)
Age: 36
End: 2021-04-15

## 2021-04-15 ENCOUNTER — APPOINTMENT (OUTPATIENT)
Dept: FAMILY MEDICINE | Facility: CLINIC | Age: 36
End: 2021-04-15
Payer: COMMERCIAL

## 2021-04-15 ENCOUNTER — LABORATORY RESULT (OUTPATIENT)
Age: 36
End: 2021-04-15

## 2021-04-15 VITALS
HEART RATE: 81 BPM | DIASTOLIC BLOOD PRESSURE: 80 MMHG | WEIGHT: 213.56 LBS | TEMPERATURE: 98.1 F | OXYGEN SATURATION: 99 % | SYSTOLIC BLOOD PRESSURE: 124 MMHG | HEIGHT: 71 IN | BODY MASS INDEX: 29.9 KG/M2 | RESPIRATION RATE: 16 BRPM

## 2021-04-15 DIAGNOSIS — R00.2 PALPITATIONS: ICD-10-CM

## 2021-04-15 DIAGNOSIS — M54.2 CERVICALGIA: ICD-10-CM

## 2021-04-15 DIAGNOSIS — S69.91XA UNSPECIFIED INJURY OF RIGHT WRIST, HAND AND FINGER(S), INITIAL ENCOUNTER: ICD-10-CM

## 2021-04-15 PROCEDURE — 99214 OFFICE O/P EST MOD 30 MIN: CPT | Mod: 25

## 2021-04-15 PROCEDURE — 36415 COLL VENOUS BLD VENIPUNCTURE: CPT

## 2021-04-15 PROCEDURE — 99072 ADDL SUPL MATRL&STAF TM PHE: CPT

## 2021-04-15 NOTE — PHYSICAL EXAM
[Well Nourished] : well nourished [Well Developed] : well developed [Well-Appearing] : well-appearing [Normal Sclera/Conjunctiva] : normal sclera/conjunctiva [PERRL] : pupils equal round and reactive to light [EOMI] : extraocular movements intact [Normal Outer Ear/Nose] : the outer ears and nose were normal in appearance [No JVD] : no jugular venous distention [No Lymphadenopathy] : no lymphadenopathy [Supple] : supple [No Respiratory Distress] : no respiratory distress  [No Accessory Muscle Use] : no accessory muscle use [Clear to Auscultation] : lungs were clear to auscultation bilaterally [Normal Rate] : normal rate  [Regular Rhythm] : with a regular rhythm [Normal S1, S2] : normal S1 and S2 [No Murmur] : no murmur heard [Soft] : abdomen soft [Non Tender] : non-tender [Non-distended] : non-distended [Normal Posterior Cervical Nodes] : no posterior cervical lymphadenopathy [Normal Anterior Cervical Nodes] : no anterior cervical lymphadenopathy [No Joint Swelling] : no joint swelling [Grossly Normal Strength/Tone] : grossly normal strength/tone [No Rash] : no rash [Coordination Grossly Intact] : coordination grossly intact [No Focal Deficits] : no focal deficits [Normal Gait] : normal gait [Deep Tendon Reflexes (DTR)] : deep tendon reflexes were 2+ and symmetric [Normal Affect] : the affect was normal [Normal Insight/Judgement] : insight and judgment were intact [No Acute Distress] : no acute distress

## 2021-04-15 NOTE — PLAN
[FreeTextEntry1] : Blood work obtained.\par Will call with results of blood work.\par Rx xanax renewed

## 2021-04-15 NOTE — HISTORY OF PRESENT ILLNESS
[FreeTextEntry1] : NELY would lie to discuss getting blood work, was working and was stuck with possible needle or nail not sure about 2 weeks ago\par also would like to potentially get something for migraines. \par needs refill on alprazolam 0.25mg\par [facility] [place] \par NKDA  [de-identified] : NELY was stuck "with something". He is not sure it it was nail or a needle.

## 2021-04-30 LAB
ALBUMIN SERPL ELPH-MCNC: 4.4 G/DL
ALP BLD-CCNC: 52 U/L
ALT SERPL-CCNC: 16 U/L
ANION GAP SERPL CALC-SCNC: 14 MMOL/L
AST SERPL-CCNC: 18 U/L
BASOPHILS # BLD AUTO: 0.05 K/UL
BASOPHILS # BLD AUTO: 0.16 K/UL
BASOPHILS NFR BLD AUTO: 0.5 %
BASOPHILS NFR BLD AUTO: 1.6 %
BILIRUB SERPL-MCNC: 0.5 MG/DL
BUN SERPL-MCNC: 15 MG/DL
CALCIUM SERPL-MCNC: 9.7 MG/DL
CHLORIDE SERPL-SCNC: 101 MMOL/L
CHOLEST SERPL-MCNC: 153 MG/DL
CO2 SERPL-SCNC: 24 MMOL/L
CREAT SERPL-MCNC: 1.28 MG/DL
EOSINOPHIL # BLD AUTO: 0 K/UL
EOSINOPHIL # BLD AUTO: 0.01 K/UL
EOSINOPHIL NFR BLD AUTO: 0 %
EOSINOPHIL NFR BLD AUTO: 0.1 %
ESTIMATED AVERAGE GLUCOSE: 105 MG/DL
GLUCOSE SERPL-MCNC: 77 MG/DL
HBA1C MFR BLD HPLC: 5.3 %
HBV SURFACE AG SER QL: NONREACTIVE
HCT VFR BLD CALC: 43.6 %
HCT VFR BLD CALC: 43.9 %
HCV AB SER QL: NONREACTIVE
HCV S/CO RATIO: 0.17 S/CO
HDLC SERPL-MCNC: 55 MG/DL
HGB BLD-MCNC: 14.6 G/DL
HGB BLD-MCNC: 14.8 G/DL
HIV1+2 AB SPEC QL IA.RAPID: NONREACTIVE
IMM GRANULOCYTES NFR BLD AUTO: 0.3 %
LDLC SERPL CALC-MCNC: 86 MG/DL
LYMPHOCYTES # BLD AUTO: 0.98 K/UL
LYMPHOCYTES # BLD AUTO: 1.17 K/UL
LYMPHOCYTES NFR BLD AUTO: 11.6 %
LYMPHOCYTES NFR BLD AUTO: 9.7 %
MAN DIFF?: NORMAL
MAN DIFF?: NORMAL
MCHC RBC-ENTMCNC: 32.4 PG
MCHC RBC-ENTMCNC: 32.5 PG
MCHC RBC-ENTMCNC: 33.5 GM/DL
MCHC RBC-ENTMCNC: 33.7 GM/DL
MCV RBC AUTO: 96.5 FL
MCV RBC AUTO: 96.9 FL
MONOCYTES # BLD AUTO: 0.59 K/UL
MONOCYTES # BLD AUTO: 0.66 K/UL
MONOCYTES NFR BLD AUTO: 5.8 %
MONOCYTES NFR BLD AUTO: 6.5 %
NEUTROPHILS # BLD AUTO: 8.18 K/UL
NEUTROPHILS # BLD AUTO: 8.37 K/UL
NEUTROPHILS NFR BLD AUTO: 76.9 %
NEUTROPHILS NFR BLD AUTO: 82.9 %
NONHDLC SERPL-MCNC: 98 MG/DL
PLATELET # BLD AUTO: 202 K/UL
PLATELET # BLD AUTO: 209 K/UL
POTASSIUM SERPL-SCNC: 4.5 MMOL/L
PROT SERPL-MCNC: 7.1 G/DL
RBC # BLD: 4.5 M/UL
RBC # BLD: 4.55 M/UL
RBC # FLD: 11.9 %
RBC # FLD: 12 %
SODIUM SERPL-SCNC: 138 MMOL/L
TRIGL SERPL-MCNC: 62 MG/DL
TSH SERPL-ACNC: 0.63 UIU/ML
WBC # FLD AUTO: 10.1 K/UL
WBC # FLD AUTO: 10.1 K/UL

## 2021-09-01 ENCOUNTER — EMERGENCY (EMERGENCY)
Facility: HOSPITAL | Age: 36
LOS: 1 days | End: 2021-09-01
Admitting: EMERGENCY MEDICINE
Payer: COMMERCIAL

## 2021-09-01 PROCEDURE — 99285 EMERGENCY DEPT VISIT HI MDM: CPT

## 2021-09-01 PROCEDURE — 71045 X-RAY EXAM CHEST 1 VIEW: CPT | Mod: 26

## 2021-09-01 PROCEDURE — 72170 X-RAY EXAM OF PELVIS: CPT | Mod: 26

## 2021-09-01 PROCEDURE — 72125 CT NECK SPINE W/O DYE: CPT | Mod: 26

## 2021-09-01 PROCEDURE — 70450 CT HEAD/BRAIN W/O DYE: CPT | Mod: 26

## 2021-09-01 PROCEDURE — 93010 ELECTROCARDIOGRAM REPORT: CPT

## 2021-09-29 ENCOUNTER — APPOINTMENT (OUTPATIENT)
Dept: FAMILY MEDICINE | Facility: CLINIC | Age: 36
End: 2021-09-29
Payer: COMMERCIAL

## 2021-09-29 VITALS
DIASTOLIC BLOOD PRESSURE: 90 MMHG | TEMPERATURE: 98 F | HEART RATE: 80 BPM | BODY MASS INDEX: 30.1 KG/M2 | SYSTOLIC BLOOD PRESSURE: 138 MMHG | OXYGEN SATURATION: 98 % | HEIGHT: 71 IN | WEIGHT: 215 LBS | RESPIRATION RATE: 16 BRPM

## 2021-09-29 VITALS — SYSTOLIC BLOOD PRESSURE: 130 MMHG | DIASTOLIC BLOOD PRESSURE: 78 MMHG

## 2021-09-29 DIAGNOSIS — Z02.4 ENCOUNTER FOR EXAMINATION FOR DRIVING LICENSE: ICD-10-CM

## 2021-09-29 PROCEDURE — 99214 OFFICE O/P EST MOD 30 MIN: CPT | Mod: 25

## 2021-09-29 PROCEDURE — 81003 URINALYSIS AUTO W/O SCOPE: CPT | Mod: QW

## 2021-12-28 ENCOUNTER — APPOINTMENT (OUTPATIENT)
Dept: FAMILY MEDICINE | Facility: CLINIC | Age: 36
End: 2021-12-28
Payer: COMMERCIAL

## 2021-12-28 VITALS
OXYGEN SATURATION: 97 % | RESPIRATION RATE: 16 BRPM | SYSTOLIC BLOOD PRESSURE: 120 MMHG | TEMPERATURE: 97.7 F | WEIGHT: 219 LBS | HEIGHT: 71 IN | DIASTOLIC BLOOD PRESSURE: 80 MMHG | BODY MASS INDEX: 30.66 KG/M2 | HEART RATE: 86 BPM

## 2021-12-28 DIAGNOSIS — Z01.818 ENCOUNTER FOR OTHER PREPROCEDURAL EXAMINATION: ICD-10-CM

## 2021-12-28 PROCEDURE — 99214 OFFICE O/P EST MOD 30 MIN: CPT

## 2021-12-28 RX ORDER — TADALAFIL 20 MG/1
20 TABLET ORAL
Qty: 6 | Refills: 1 | Status: DISCONTINUED | COMMUNITY
Start: 2020-04-30 | End: 2021-12-28

## 2021-12-30 NOTE — HISTORY OF PRESENT ILLNESS
[No Pertinent Cardiac History] : no history of aortic stenosis, atrial fibrillation, coronary artery disease, recent myocardial infarction, or implantable device/pacemaker [No Pertinent Pulmonary History] : no history of asthma, COPD, sleep apnea, or smoking [No Adverse Anesthesia Reaction] : no adverse anesthesia reaction in self or family member [(Patient denies any chest pain, claudication, dyspnea on exertion, orthopnea, palpitations or syncope)] : Patient denies any chest pain, claudication, dyspnea on exertion, orthopnea, palpitations or syncope [Chronic Anticoagulation] : no chronic anticoagulation [Chronic Kidney Disease] : no chronic kidney disease [Diabetes] : no diabetes [FreeTextEntry1] : Herniated Disks in neck  [FreeTextEntry2] : 1/10/2021 [FreeTextEntry3] :   [FreeTextEntry4] : [facility] \par phone:[phone]\par fax:[phone]

## 2022-01-10 ENCOUNTER — APPOINTMENT (OUTPATIENT)
Dept: FAMILY MEDICINE | Facility: CLINIC | Age: 37
End: 2022-01-10
Payer: COMMERCIAL

## 2022-01-10 VITALS
HEIGHT: 71 IN | RESPIRATION RATE: 14 BRPM | DIASTOLIC BLOOD PRESSURE: 84 MMHG | SYSTOLIC BLOOD PRESSURE: 142 MMHG | BODY MASS INDEX: 30.91 KG/M2 | TEMPERATURE: 97.6 F | WEIGHT: 220.8 LBS | HEART RATE: 70 BPM | OXYGEN SATURATION: 98 %

## 2022-01-10 DIAGNOSIS — J34.2 DEVIATED NASAL SEPTUM: ICD-10-CM

## 2022-01-10 PROCEDURE — 99214 OFFICE O/P EST MOD 30 MIN: CPT

## 2022-01-10 NOTE — PHYSICAL EXAM
[No Acute Distress] : no acute distress [Well Nourished] : well nourished [Well Developed] : well developed [Well-Appearing] : well-appearing [Normal Sclera/Conjunctiva] : normal sclera/conjunctiva [PERRL] : pupils equal round and reactive to light [EOMI] : extraocular movements intact [Normal Outer Ear/Nose] : the outer ears and nose were normal in appearance [No JVD] : no jugular venous distention [No Lymphadenopathy] : no lymphadenopathy [Supple] : supple [No Respiratory Distress] : no respiratory distress  [No Accessory Muscle Use] : no accessory muscle use [Clear to Auscultation] : lungs were clear to auscultation bilaterally [Normal Rate] : normal rate  [Regular Rhythm] : with a regular rhythm [Normal S1, S2] : normal S1 and S2 [No Murmur] : no murmur heard [Soft] : abdomen soft [Non Tender] : non-tender [Non-distended] : non-distended [Normal Posterior Cervical Nodes] : no posterior cervical lymphadenopathy [Normal Anterior Cervical Nodes] : no anterior cervical lymphadenopathy [No Joint Swelling] : no joint swelling [Grossly Normal Strength/Tone] : grossly normal strength/tone [No Rash] : no rash [Coordination Grossly Intact] : coordination grossly intact [No Focal Deficits] : no focal deficits [Normal Gait] : normal gait [Deep Tendon Reflexes (DTR)] : deep tendon reflexes were 2+ and symmetric [Normal Affect] : the affect was normal [Normal Insight/Judgement] : insight and judgment were intact [de-identified] : RIght nasal deviation

## 2022-01-28 ENCOUNTER — NON-APPOINTMENT (OUTPATIENT)
Age: 37
End: 2022-01-28

## 2022-03-31 ENCOUNTER — APPOINTMENT (OUTPATIENT)
Dept: CT IMAGING | Facility: CLINIC | Age: 37
End: 2022-03-31

## 2022-04-26 ENCOUNTER — OUTPATIENT (OUTPATIENT)
Dept: OUTPATIENT SERVICES | Facility: HOSPITAL | Age: 37
LOS: 1 days | End: 2022-04-26
Payer: COMMERCIAL

## 2022-04-26 PROCEDURE — 88305 TISSUE EXAM BY PATHOLOGIST: CPT | Mod: 26

## 2022-04-27 DIAGNOSIS — K62.5 HEMORRHAGE OF ANUS AND RECTUM: ICD-10-CM

## 2022-05-31 ENCOUNTER — RX RENEWAL (OUTPATIENT)
Age: 37
End: 2022-05-31

## 2022-05-31 RX ORDER — FLUTICASONE PROPIONATE 50 UG/1
50 SPRAY, METERED NASAL TWICE DAILY
Qty: 16 | Refills: 2 | Status: ACTIVE | COMMUNITY
Start: 2022-01-10 | End: 1900-01-01

## 2022-11-06 ENCOUNTER — APPOINTMENT (OUTPATIENT)
Dept: FAMILY MEDICINE | Facility: CLINIC | Age: 37
End: 2022-11-06

## 2022-11-06 ENCOUNTER — NON-APPOINTMENT (OUTPATIENT)
Age: 37
End: 2022-11-06

## 2022-11-06 VITALS
OXYGEN SATURATION: 98 % | HEIGHT: 71 IN | RESPIRATION RATE: 16 BRPM | HEART RATE: 77 BPM | DIASTOLIC BLOOD PRESSURE: 82 MMHG | WEIGHT: 218 LBS | SYSTOLIC BLOOD PRESSURE: 138 MMHG | BODY MASS INDEX: 30.52 KG/M2

## 2022-11-06 DIAGNOSIS — R09.89 OTHER SPECIFIED SYMPTOMS AND SIGNS INVOLVING THE CIRCULATORY AND RESPIRATORY SYSTEMS: ICD-10-CM

## 2022-11-06 PROCEDURE — 99214 OFFICE O/P EST MOD 30 MIN: CPT

## 2022-11-06 RX ORDER — ALBUTEROL SULFATE 90 UG/1
108 (90 BASE) INHALANT RESPIRATORY (INHALATION)
Qty: 1 | Refills: 3 | Status: ACTIVE | COMMUNITY
Start: 2022-11-06 | End: 1900-01-01

## 2022-11-06 RX ORDER — CLOMIPHENE CITRATE 50 MG/1
50 TABLET ORAL DAILY
Qty: 45 | Refills: 1 | Status: DISCONTINUED | COMMUNITY
Start: 2022-10-06 | End: 2022-11-06

## 2022-11-06 NOTE — PHYSICAL EXAM
[No Acute Distress] : no acute distress [Well Nourished] : well nourished [Well Developed] : well developed [Well-Appearing] : well-appearing [Normal Sclera/Conjunctiva] : normal sclera/conjunctiva [PERRL] : pupils equal round and reactive to light [EOMI] : extraocular movements intact [Normal Outer Ear/Nose] : the outer ears and nose were normal in appearance [No Lymphadenopathy] : no lymphadenopathy [No JVD] : no jugular venous distention [Supple] : supple [No Respiratory Distress] : no respiratory distress  [No Accessory Muscle Use] : no accessory muscle use [Clear to Auscultation] : lungs were clear to auscultation bilaterally [Normal Rate] : normal rate  [Regular Rhythm] : with a regular rhythm [Normal S1, S2] : normal S1 and S2 [No Murmur] : no murmur heard [Soft] : abdomen soft [Non Tender] : non-tender [Non-distended] : non-distended [Normal Posterior Cervical Nodes] : no posterior cervical lymphadenopathy [Normal Anterior Cervical Nodes] : no anterior cervical lymphadenopathy [No Joint Swelling] : no joint swelling [Grossly Normal Strength/Tone] : grossly normal strength/tone [No Rash] : no rash [Coordination Grossly Intact] : coordination grossly intact [No Focal Deficits] : no focal deficits [Normal Gait] : normal gait [Deep Tendon Reflexes (DTR)] : deep tendon reflexes were 2+ and symmetric [Normal Affect] : the affect was normal [Alert and Oriented x3] : oriented to person, place, and time [Normal Mood] : the mood was normal [Normal Insight/Judgement] : insight and judgment were intact

## 2022-11-06 NOTE — HISTORY OF PRESENT ILLNESS
[FreeTextEntry1] : Milo is here to discuss inc anxiety\par He also c/o crackling in his lungs  [de-identified] : NELY states he feels otherwise in good health.\par Fatigue, anxiety would like new inhailer.

## 2023-04-26 ENCOUNTER — APPOINTMENT (OUTPATIENT)
Dept: FAMILY MEDICINE | Facility: CLINIC | Age: 38
End: 2023-04-26

## 2023-05-16 DIAGNOSIS — E29.1 TESTICULAR HYPOFUNCTION: ICD-10-CM

## 2023-06-07 ENCOUNTER — APPOINTMENT (OUTPATIENT)
Dept: ENDOCRINOLOGY | Facility: CLINIC | Age: 38
End: 2023-06-07

## 2023-06-07 ENCOUNTER — NON-APPOINTMENT (OUTPATIENT)
Age: 38
End: 2023-06-07

## 2023-06-07 ENCOUNTER — APPOINTMENT (OUTPATIENT)
Dept: FAMILY MEDICINE | Facility: CLINIC | Age: 38
End: 2023-06-07
Payer: COMMERCIAL

## 2023-06-07 VITALS
RESPIRATION RATE: 16 BRPM | DIASTOLIC BLOOD PRESSURE: 80 MMHG | OXYGEN SATURATION: 98 % | HEIGHT: 71 IN | TEMPERATURE: 98 F | BODY MASS INDEX: 27.72 KG/M2 | SYSTOLIC BLOOD PRESSURE: 128 MMHG | WEIGHT: 198 LBS | HEART RATE: 74 BPM

## 2023-06-07 DIAGNOSIS — F32.A ANXIETY DISORDER, UNSPECIFIED: ICD-10-CM

## 2023-06-07 DIAGNOSIS — S30.861A INSECT BITE (NONVENOMOUS) OF ABDOMINAL WALL, INITIAL ENCOUNTER: ICD-10-CM
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
## 2023-06-07 DIAGNOSIS — R79.89 OTHER SPECIFIED ABNORMAL FINDINGS OF BLOOD CHEMISTRY: ICD-10-CM

## 2023-06-07 DIAGNOSIS — F41.9 ANXIETY DISORDER, UNSPECIFIED: ICD-10-CM

## 2023-06-07 PROCEDURE — 99395 PREV VISIT EST AGE 18-39: CPT | Mod: 25

## 2023-06-07 PROCEDURE — 36415 COLL VENOUS BLD VENIPUNCTURE: CPT

## 2023-06-07 PROCEDURE — 81003 URINALYSIS AUTO W/O SCOPE: CPT | Mod: NC,QW

## 2023-06-07 RX ORDER — PANTOPRAZOLE 40 MG/1
40 TABLET, DELAYED RELEASE ORAL
Qty: 30 | Refills: 0 | Status: DISCONTINUED | COMMUNITY
Start: 2022-10-24 | End: 2023-06-07

## 2023-06-07 RX ORDER — FAMOTIDINE 20 MG/1
20 TABLET, FILM COATED ORAL
Refills: 0 | Status: DISCONTINUED | COMMUNITY
Start: 2022-11-04 | End: 2023-06-07

## 2023-06-07 RX ORDER — ALPRAZOLAM 0.25 MG/1
0.25 TABLET ORAL
Qty: 60 | Refills: 0 | Status: DISCONTINUED | COMMUNITY
Start: 2018-05-17 | End: 2023-06-07

## 2023-06-07 NOTE — PHYSICAL EXAM
[No Acute Distress] : no acute distress [Well Nourished] : well nourished [Well Developed] : well developed [Well-Appearing] : well-appearing [Normal Sclera/Conjunctiva] : normal sclera/conjunctiva [PERRL] : pupils equal round and reactive to light [EOMI] : extraocular movements intact [Normal Outer Ear/Nose] : the outer ears and nose were normal in appearance [No JVD] : no jugular venous distention [No Lymphadenopathy] : no lymphadenopathy [Supple] : supple [No Respiratory Distress] : no respiratory distress  [No Accessory Muscle Use] : no accessory muscle use [Clear to Auscultation] : lungs were clear to auscultation bilaterally [Normal Rate] : normal rate  [Regular Rhythm] : with a regular rhythm [Normal S1, S2] : normal S1 and S2 [No Murmur] : no murmur heard [Soft] : abdomen soft [Non Tender] : non-tender [Non-distended] : non-distended [No Joint Swelling] : no joint swelling [Grossly Normal Strength/Tone] : grossly normal strength/tone [No Rash] : no rash [Coordination Grossly Intact] : coordination grossly intact [No Focal Deficits] : no focal deficits [Normal Gait] : normal gait [Deep Tendon Reflexes (DTR)] : deep tendon reflexes were 2+ and symmetric [Normal Affect] : the affect was normal [Alert and Oriented x3] : oriented to person, place, and time [Normal Mood] : the mood was normal [Normal Insight/Judgement] : insight and judgment were intact

## 2023-06-07 NOTE — HEALTH RISK ASSESSMENT
[Yes] : Yes [2 - 3 times a week (3 pts)] : 2 - 3  times a week (3 points) [3 or 4 (1 pt)] : 3 or 4  (1 point) [Monthly (2 pts)] : Monthly (2 points) [No] : In the past 12 months have you used drugs other than those required for medical reasons? No [0] : 2) Feeling down, depressed, or hopeless: Not at all (0) [PHQ-2 Negative - No further assessment needed] : PHQ-2 Negative - No further assessment needed [Audit-CScore] : 6 [QTO2Cthcc] : 0

## 2023-06-07 NOTE — HISTORY OF PRESENT ILLNESS
[FreeTextEntry1] : CPE \par discuss getting tested for lyme as he was bit by 2 ticks 2 weeks ago\par no refills needed \par [facility] [place] [de-identified] : NELY states he feels otherwise in good health.

## 2023-06-08 LAB
ALBUMIN SERPL ELPH-MCNC: 4.4 G/DL
ALP BLD-CCNC: 51 U/L
ALT SERPL-CCNC: 17 U/L
ANION GAP SERPL CALC-SCNC: 11 MMOL/L
AST SERPL-CCNC: 19 U/L
BILIRUB SERPL-MCNC: 0.2 MG/DL
BILIRUB UR QL STRIP: NEGATIVE
BUN SERPL-MCNC: 15 MG/DL
CALCIUM SERPL-MCNC: 9 MG/DL
CHLORIDE SERPL-SCNC: 104 MMOL/L
CHOLEST SERPL-MCNC: 165 MG/DL
CLARITY UR: CLEAR
CO2 SERPL-SCNC: 26 MMOL/L
COLLECTION METHOD: NORMAL
CREAT SERPL-MCNC: 1.45 MG/DL
EGFR: 64 ML/MIN/1.73M2
ESTIMATED AVERAGE GLUCOSE: 100 MG/DL
GLUCOSE SERPL-MCNC: 117 MG/DL
GLUCOSE UR-MCNC: NEGATIVE
HBA1C MFR BLD HPLC: 5.1 %
HCG UR QL: 0.2 EU/DL
HDLC SERPL-MCNC: 48 MG/DL
HGB UR QL STRIP.AUTO: NEGATIVE
KETONES UR-MCNC: NEGATIVE
LDLC SERPL CALC-MCNC: 96 MG/DL
LEUKOCYTE ESTERASE UR QL STRIP: NEGATIVE
NITRITE UR QL STRIP: NEGATIVE
NONHDLC SERPL-MCNC: 117 MG/DL
PH UR STRIP: 6
POTASSIUM SERPL-SCNC: 4 MMOL/L
PROT SERPL-MCNC: 6.5 G/DL
PROT UR STRIP-MCNC: NEGATIVE
SODIUM SERPL-SCNC: 141 MMOL/L
SP GR UR STRIP: 1.01
TRIGL SERPL-MCNC: 105 MG/DL
TSH SERPL-ACNC: 0.94 UIU/ML

## 2023-06-14 LAB
A PHAGOCYTOPH IGG TITR SER IF: NORMAL TITER
B BURGDOR AB SER QL IA: NEGATIVE
B MICROTI IGG TITR SER: NORMAL TITER
E CHAFFEENSIS IGG TITR SER IF: NORMAL TITER
TESTOST FREE SERPL-MCNC: 11.6 PG/ML
TESTOST SERPL-MCNC: 259 NG/DL

## 2023-09-22 ENCOUNTER — APPOINTMENT (OUTPATIENT)
Dept: FAMILY MEDICINE | Facility: CLINIC | Age: 38
End: 2023-09-22
Payer: SELF-PAY

## 2023-09-22 VITALS
BODY MASS INDEX: 28.98 KG/M2 | OXYGEN SATURATION: 98 % | SYSTOLIC BLOOD PRESSURE: 128 MMHG | HEIGHT: 71 IN | RESPIRATION RATE: 16 BRPM | HEART RATE: 55 BPM | TEMPERATURE: 97.3 F | DIASTOLIC BLOOD PRESSURE: 82 MMHG | WEIGHT: 207 LBS

## 2023-09-22 DIAGNOSIS — Z02.89 ENCOUNTER FOR OTHER ADMINISTRATIVE EXAMINATIONS: ICD-10-CM

## 2023-09-22 LAB
BILIRUB UR QL STRIP: NEGATIVE
CLARITY UR: CLEAR
COLLECTION METHOD: NORMAL
GLUCOSE UR-MCNC: NEGATIVE
HCG UR QL: 0.2 EU/DL
HGB UR QL STRIP.AUTO: NEGATIVE
KETONES UR-MCNC: NEGATIVE
LEUKOCYTE ESTERASE UR QL STRIP: NEGATIVE
NITRITE UR QL STRIP: NEGATIVE
PH UR STRIP: 6.5
PROT UR STRIP-MCNC: NEGATIVE
SP GR UR STRIP: 1.01

## 2023-09-22 PROCEDURE — 99395 PREV VISIT EST AGE 18-39: CPT | Mod: 25

## 2023-09-22 PROCEDURE — 81003 URINALYSIS AUTO W/O SCOPE: CPT | Mod: QW

## 2023-10-24 DIAGNOSIS — Z13.220 ENCOUNTER FOR SCREENING FOR LIPOID DISORDERS: ICD-10-CM

## 2023-10-24 DIAGNOSIS — Z13.1 ENCOUNTER FOR SCREENING FOR DIABETES MELLITUS: ICD-10-CM

## 2023-10-24 DIAGNOSIS — Z13.29 ENCOUNTER FOR SCREENING FOR OTHER SUSPECTED ENDOCRINE DISORDER: ICD-10-CM

## 2024-01-15 ENCOUNTER — NON-APPOINTMENT (OUTPATIENT)
Age: 39
End: 2024-01-15

## 2024-02-07 RX ORDER — CLOMIPHENE CITRATE 50 MG/1
50 TABLET ORAL DAILY
Qty: 45 | Refills: 1 | Status: ACTIVE | COMMUNITY
Start: 1900-01-01 | End: 1900-01-01

## 2024-02-26 ENCOUNTER — APPOINTMENT (OUTPATIENT)
Dept: ENDOCRINOLOGY | Facility: CLINIC | Age: 39
End: 2024-02-26